# Patient Record
Sex: MALE | Race: WHITE | NOT HISPANIC OR LATINO | Employment: FULL TIME | URBAN - METROPOLITAN AREA
[De-identification: names, ages, dates, MRNs, and addresses within clinical notes are randomized per-mention and may not be internally consistent; named-entity substitution may affect disease eponyms.]

---

## 2018-03-20 ENCOUNTER — OFFICE VISIT (OUTPATIENT)
Dept: URGENT CARE | Facility: PHYSICIAN GROUP | Age: 33
End: 2018-03-20

## 2018-03-20 VITALS
HEIGHT: 72 IN | SYSTOLIC BLOOD PRESSURE: 112 MMHG | HEART RATE: 110 BPM | RESPIRATION RATE: 14 BRPM | TEMPERATURE: 97.7 F | WEIGHT: 215 LBS | DIASTOLIC BLOOD PRESSURE: 74 MMHG | BODY MASS INDEX: 29.12 KG/M2 | OXYGEN SATURATION: 98 %

## 2018-03-20 DIAGNOSIS — J01.00 ACUTE NON-RECURRENT MAXILLARY SINUSITIS: ICD-10-CM

## 2018-03-20 PROCEDURE — 99203 OFFICE O/P NEW LOW 30 MIN: CPT | Performed by: PHYSICIAN ASSISTANT

## 2018-03-20 RX ORDER — AMOXICILLIN AND CLAVULANATE POTASSIUM 875; 125 MG/1; MG/1
1 TABLET, FILM COATED ORAL 2 TIMES DAILY
Qty: 14 TAB | Refills: 0 | Status: SHIPPED | OUTPATIENT
Start: 2018-03-20 | End: 2018-03-27

## 2018-03-20 ASSESSMENT — ENCOUNTER SYMPTOMS
SPUTUM PRODUCTION: 0
CHILLS: 0
VOMITING: 0
DIARRHEA: 0
DIZZINESS: 0
SORE THROAT: 0
SHORTNESS OF BREATH: 0
SINUS PRESSURE: 1
MUSCULOSKELETAL NEGATIVE: 1
FEVER: 0
SINUS PAIN: 1
COUGH: 0
NAUSEA: 0
ABDOMINAL PAIN: 0

## 2018-03-20 NOTE — PROGRESS NOTES
Subjective:      Bruno Goncalves is a 33 y.o. male who presents with Sinus Problem (x3days sinus congestion)            Sinus Problem   This is a new problem. The current episode started in the past 7 days. The problem is unchanged. There has been no fever. His pain is at a severity of 3/10. The pain is mild. Associated symptoms include congestion and sinus pressure. Pertinent negatives include no chills, coughing, ear pain, shortness of breath, sneezing or sore throat. Past treatments include nothing.       Review of Systems   Constitutional: Negative for chills and fever.   HENT: Positive for congestion, sinus pain and sinus pressure. Negative for ear pain, sneezing and sore throat.    Respiratory: Negative for cough, sputum production and shortness of breath.    Cardiovascular: Negative for chest pain.   Gastrointestinal: Negative for abdominal pain, diarrhea, nausea and vomiting.   Genitourinary: Negative.    Musculoskeletal: Negative.    Skin: Negative for rash.   Neurological: Negative for dizziness.          Objective:     /74   Pulse (!) 110   Temp 36.5 °C (97.7 °F)   Resp 14   Ht 1.829 m (6')   Wt 97.5 kg (215 lb)   SpO2 98%   BMI 29.16 kg/m²      Physical Exam   Constitutional: He is oriented to person, place, and time. He appears well-developed and well-nourished. No distress.   HENT:   Head: Normocephalic and atraumatic.   Right Ear: Hearing, tympanic membrane, external ear and ear canal normal.   Left Ear: Hearing, tympanic membrane, external ear and ear canal normal.   Nose: Right sinus exhibits maxillary sinus tenderness. Right sinus exhibits no frontal sinus tenderness. Left sinus exhibits maxillary sinus tenderness. Left sinus exhibits no frontal sinus tenderness.   Mouth/Throat: Oropharynx is clear and moist. No oropharyngeal exudate, posterior oropharyngeal edema or posterior oropharyngeal erythema.   Eyes: Conjunctivae are normal. Pupils are equal, round, and reactive to light.  Right eye exhibits no discharge. Left eye exhibits no discharge.   Neck: Normal range of motion.   Cardiovascular: Normal rate, regular rhythm and normal heart sounds.    No murmur heard.  Pulmonary/Chest: Effort normal and breath sounds normal. No respiratory distress. He has no wheezes. He has no rales.   Musculoskeletal: Normal range of motion.   Lymphadenopathy:     He has no cervical adenopathy.   Neurological: He is alert and oriented to person, place, and time.   Skin: Skin is warm and dry. He is not diaphoretic.   Psychiatric: He has a normal mood and affect. His behavior is normal.   Nursing note and vitals reviewed.         PMH:  has no past medical history on file.  MEDS:   Current Outpatient Prescriptions:   •  amoxicillin-clavulanate (AUGMENTIN) 875-125 MG Tab, Take 1 Tab by mouth 2 times a day for 7 days., Disp: 14 Tab, Rfl: 0  •  ALBUTEROL SULFATE PO, Take  by mouth., Disp: , Rfl:   •  Ibuprofen (ADVIL PO), Take  by mouth., Disp: , Rfl:   •  hydrocodone-acetaminophen (NORCO) 7.5-325 MG per tablet, Take 1 Tab by mouth every 6 hours as needed for Mild Pain., Disp: 15 Tab, Rfl: 0  ALLERGIES: No Known Allergies  SURGHX: History reviewed. No pertinent surgical history.  SOCHX:  reports that he has never smoked. He has never used smokeless tobacco. He reports that he does not drink alcohol.  FH: family history is not on file.       Assessment/Plan:     1. Acute non-recurrent maxillary sinusitis  - amoxicillin-clavulanate (AUGMENTIN) 875-125 MG Tab; Take 1 Tab by mouth 2 times a day for 7 days.  Dispense: 14 Tab; Refill: 0   - Complete full course of antibiotics as prescribed     Discussed use of nedi-pot, humidifier, and Flonase nasal spray for symptomatic relief. Call or return to office if symptoms persist or worsen. The patient demonstrated a good understanding and agreed with the treatment plan.

## 2020-08-14 ENCOUNTER — OFFICE VISIT (OUTPATIENT)
Dept: URGENT CARE | Facility: PHYSICIAN GROUP | Age: 35
End: 2020-08-14

## 2020-08-14 ENCOUNTER — HOSPITAL ENCOUNTER (EMERGENCY)
Facility: MEDICAL CENTER | Age: 35
End: 2020-08-14
Attending: EMERGENCY MEDICINE
Payer: COMMERCIAL

## 2020-08-14 VITALS
BODY MASS INDEX: 29.95 KG/M2 | SYSTOLIC BLOOD PRESSURE: 122 MMHG | DIASTOLIC BLOOD PRESSURE: 70 MMHG | HEIGHT: 72 IN | RESPIRATION RATE: 15 BRPM | HEART RATE: 70 BPM | WEIGHT: 221.12 LBS | TEMPERATURE: 97.9 F | OXYGEN SATURATION: 95 %

## 2020-08-14 VITALS
RESPIRATION RATE: 16 BRPM | TEMPERATURE: 97.6 F | WEIGHT: 215 LBS | OXYGEN SATURATION: 96 % | DIASTOLIC BLOOD PRESSURE: 82 MMHG | BODY MASS INDEX: 29.12 KG/M2 | HEART RATE: 80 BPM | SYSTOLIC BLOOD PRESSURE: 100 MMHG | HEIGHT: 72 IN

## 2020-08-14 DIAGNOSIS — T18.128A FOOD IMPACTION OF ESOPHAGUS, INITIAL ENCOUNTER: ICD-10-CM
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
## 2020-08-14 DIAGNOSIS — T18.128A ESOPHAGEAL OBSTRUCTION DUE TO FOOD IMPACTION: ICD-10-CM
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
## 2020-08-14 PROCEDURE — 94770 HCHG CO2 EXPIRED GAS DETERMINATION: CPT

## 2020-08-14 PROCEDURE — 94760 N-INVAS EAR/PLS OXIMETRY 1: CPT

## 2020-08-14 PROCEDURE — 99284 EMERGENCY DEPT VISIT MOD MDM: CPT

## 2020-08-14 PROCEDURE — 502240 HCHG MISC OR SUPPLY RC 0272: Performed by: INTERNAL MEDICINE

## 2020-08-14 PROCEDURE — 160203 HCHG ENDO MINUTES - 1ST 30 MINS LEVEL 4: Performed by: INTERNAL MEDICINE

## 2020-08-14 PROCEDURE — 99152 MOD SED SAME PHYS/QHP 5/>YRS: CPT

## 2020-08-14 PROCEDURE — 96375 TX/PRO/DX INJ NEW DRUG ADDON: CPT

## 2020-08-14 PROCEDURE — 99213 OFFICE O/P EST LOW 20 MIN: CPT | Performed by: FAMILY MEDICINE

## 2020-08-14 PROCEDURE — 700111 HCHG RX REV CODE 636 W/ 250 OVERRIDE (IP): Performed by: EMERGENCY MEDICINE

## 2020-08-14 PROCEDURE — 700111 HCHG RX REV CODE 636 W/ 250 OVERRIDE (IP)

## 2020-08-14 PROCEDURE — 160048 HCHG OR STATISTICAL LEVEL 1-5: Performed by: INTERNAL MEDICINE

## 2020-08-14 PROCEDURE — 96374 THER/PROPH/DIAG INJ IV PUSH: CPT

## 2020-08-14 RX ORDER — ONDANSETRON 2 MG/ML
4 INJECTION INTRAMUSCULAR; INTRAVENOUS ONCE
Status: COMPLETED | OUTPATIENT
Start: 2020-08-14 | End: 2020-08-14

## 2020-08-14 RX ORDER — OMEPRAZOLE 20 MG/1
20 CAPSULE, DELAYED RELEASE ORAL DAILY
Qty: 30 CAP | Refills: 0 | Status: SHIPPED | OUTPATIENT
Start: 2020-08-14 | End: 2020-09-13

## 2020-08-14 RX ADMIN — ONDANSETRON 4 MG: 2 INJECTION INTRAMUSCULAR; INTRAVENOUS at 13:28

## 2020-08-14 RX ADMIN — GLUCAGON 1 MG: 1 INJECTION, POWDER, LYOPHILIZED, FOR SOLUTION INTRAMUSCULAR; INTRAVENOUS at 13:29

## 2020-08-14 RX ADMIN — PROPOFOL 370 MG: 10 INJECTION, EMULSION INTRAVENOUS at 15:54

## 2020-08-14 NOTE — PROGRESS NOTES
Subjective:      Bruno Goncalves is a 35 y.o. male who presents with Other (Pt states he feels like  somthing is stuck in chest, cant keep anything down)    - This is a pleasant and non toxic appearing 35 y.o. male with c/o eating steak and it got stuck last night. Unable to keep any liquids or saliva down since. No NVFC            ALLERGIES:  Patient has no known allergies.     PMH:  History reviewed. No pertinent past medical history.     PSH:  History reviewed. No pertinent surgical history.    MEDS:  No current outpatient medications on file.    ** I have documented what I find to be significant in regards to past medical, social, family and surgical history  in my HPI or under PMH/PSH/FH review section, otherwise it is contributory **             HPI    Review of Systems   All other systems reviewed and are negative.         Objective:     /82   Pulse 80   Temp 36.4 °C (97.6 °F) (Temporal)   Resp 16   Ht 1.829 m (6')   Wt 97.5 kg (215 lb)   SpO2 96%   BMI 29.16 kg/m²      Physical Exam  Vitals signs and nursing note reviewed.   Constitutional:       General: He is not in acute distress.     Appearance: He is well-developed. He is not diaphoretic.   HENT:      Head: Normocephalic and atraumatic.   Cardiovascular:      Heart sounds: Normal heart sounds. No murmur.   Pulmonary:      Effort: Pulmonary effort is normal. No respiratory distress.   Abdominal:      Palpations: Abdomen is soft.      Tenderness: There is no abdominal tenderness.   Skin:     Coloration: Skin is not pale.      Findings: No rash.   Neurological:      Mental Status: He is alert.      Motor: No abnormal muscle tone.   Psychiatric:         Mood and Affect: Mood normal.         Behavior: Behavior normal.         Judgment: Judgment normal.                 Assessment/Plan:            1. Food impaction of esophagus, initial encounter         * asked patient to go ER for eval. He says he will try coca cola at home 1st and if not  working then go to ER

## 2020-08-14 NOTE — ED TRIAGE NOTES
"Chief Complaint   Patient presents with   • Food Stuck In Throat     concerned may have small piece of steak stuck in throat from last NOC     /64   Pulse 81   Temp 36.6 °C (97.9 °F) (Oral)   Resp 15   Ht 1.829 m (6')   Wt 100.3 kg (221 lb 1.9 oz)   SpO2 91%   BMI 29.99 kg/m²     Covid Screen Negative.    Pt reports is unable to swallow water \"or anything else to try to get it down.\"  No difficulty speaking or breathing noted, able to swallow secretions without difficulty.    "

## 2020-08-14 NOTE — OP REPORT
DATE OF SERVICE:  08/14/2020     INDICATION FOR PROCEDURE:  esophageal foreign body    PROCEDURE PERFORMED: EGD with foreign body removal     CONSENT:  Informed consent was obtained directly from the patient after   benefits, risks and possible alternatives were discussed.     MEDICATIONS:  370mg IV Propofol was administered by the ED physician, Elvis Goncalves MD       PROCEDURE DESCRIPTION:  The patient was placed in the left lateral decubitus position and provided with supplemental oxygen via nasal cannula.  Vital signs were monitored continuously throughout the procedure.  When ready, the upper endoscope was placed in the patient's mouth and advanced easily and carefully to the esophagus and subsequently to the stomach and duodenum.The scope was slowly withdrawn and mucosa carefully examined. Retroflexion was performed in the stomach. The patients toleration of this procedure was excellent     FINDINGS:    Esophagus: a large food bolus was identified in the distal esophagus. This was removed using a combination of snare, cortes net and alligator forceps. There were linear furrows in the esophagus suggestive of EoE. No obvious stricture was identified    Stomach: normal    Duodenum: normal         COMPLICATIONS:  No complications or blood loss during or in the immediate postoperative period.     IMPRESSION:  1.  Successful food bolus extraction     RECOMMENDATION:    1. Discharge on daily PPI  2. Instruct patient to chew food well and limit bite size  3. Plan for outpatient EGD for dilation and diagnosis of EoE in 2-3 weeks  4. Ok to discharge home today

## 2020-08-14 NOTE — ED PROVIDER NOTES
ED Provider Note    CHIEF COMPLAINT  Chief Complaint   Patient presents with   • Food Stuck In Throat     concerned may have small piece of steak stuck in throat from last NOC       HPI  Bruno Goncalves is a 35 y.o. male who presents to the emerge department with inability to swallow food or fluids.  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  Returned home and barbecued rib eye steak.  He was eating quite quickly and likely large bites and then he became intolerant to eating the rest of meal for what he believed was a stuck piece of meat.  He has not changed throughout the night or today.  He is still intolerant to fluids.  He tried to use some Coca-Cola at home with no relief.  Went to local urgent care and then sent to the ER for further evaluation and possible GI consultation for removal.  Currently feeling well otherwise.  Tolerating secretions.    REVIEW OF SYSTEMS  See HPI for further details. All other systems are negative.     PAST MEDICAL HISTORY   has a past medical history of Asthma.    SOCIAL HISTORY  Social History     Tobacco Use   • Smoking status: Never Smoker   • Smokeless tobacco: Never Used   Substance and Sexual Activity   • Alcohol use: No   • Drug use: Not Currently   • Sexual activity: Not on file       SURGICAL HISTORY   has a past surgical history that includes upper gi endoscopy,remov f.b. (8/14/2020).    CURRENT MEDICATIONS  Home Medications     Reviewed by Sathya Sibley (Pharmacy Tech) on 08/14/20 at 1347  Med List Status: Complete   Medication Last Dose Status        Patient Guillaume Taking any Medications                       ALLERGIES  No Known Allergies    PHYSICAL EXAM  VITAL SIGNS: /70   Pulse 70   Temp 36.6 °C (97.9 °F) (Oral)   Resp 15   Ht 1.829 m (6')   Wt 100.3 kg (221 lb 1.9 oz)   SpO2 95%   BMI 29.99 kg/m²  @MARIO[[id number]::@  Pulse ox interpretation: I interpret this pulse ox as normal.  Constitutional:  Alert in no apparent distress.  HENT: Normocephalic, Atraumatic, Bilateral external ears normal. Nose normal.   Eyes: Pupils are equal and reactive. Conjunctiva normal, non-icteric.   Heart: Regular rate and rythm, no murmurs.    Lungs: Clear to auscultation bilaterally.  Skin: Warm, Dry, No erythema, No rash.   Neurologic: Alert, Grossly non-focal.   Psychiatric: Affect normal, Judgment normal, Mood normal, Appears appropriate and not intoxicated.     Procedural sedation: Given patient's lack of improvement with Coca-Cola, glucagon we have proceeded with GI consultation for food bolus removal via endoscopy.    Patient consents to sedation and procedure.  Patient placed on full monitoring including end-tidal CO2 with respiratory at bedside.  Bedside suction has been set up.  Propofol sedation was initiated with initial dosing at 70 mg.  Please see nursing note for timing of remaining doses with a total of 370 mg.  Patient tolerated sedation well.  No complications.  Fluid bolus was removed.      Please see GI note for endoscopic removal of food bolus            COURSE & MEDICAL DECISION MAKING  Pertinent Labs & Imaging studies reviewed. (See chart for details)  35-year-old male presenting to the emerge department with p.o. intolerance after likely steak food bolus impaction.  Failed initial medical treatment here in the emerge department led to GI consultation.  GI at bedside.  Please see their note for endoscopic procedure.  I did complete procedural sedation with propofol as noted above.  Patient tolerated well.  He will be discharged on omeprazole as per GI preference.  He will be followed up at the GI office in the next couple weeks as they have concern for possible eosinophilic esophagitis.  He is understanding return precautions and outpatient need to take in smaller amounts of food and fluid especially of smaller sizes.    The patient will return for worsening symptoms and is stable at the time of discharge.  The patient verbalizes understanding and will comply.    FINAL IMPRESSION  1. Esophageal obstruction due to food impaction               Electronically signed by: Elvis Goncalves M.D., 8/14/2020 1:32 PM

## 2020-08-14 NOTE — ED NOTES
Attempted to use soda and glucagon to pass food bolus. Unsuccessful. MD notified. Pt updated on plan of care. Patient resting in bed. Patient occasionally self adjusts in bed. Bed is at lowest position and locked. Call light and preferred belongings in reach. Patient denies pain and any current needs. Will continue to monitor.

## 2020-08-14 NOTE — CONSULTS
Gastroenterology Consult Note     Date of Consult: 08/14/2020  Referring Physician: Sacha     Reason for consult: esophageal foreign body        HPI: This is a 36 yo male with history of asthma who presents after developing a food impaction. He says that he was eating steak last night. He immediately felt food get stuck in his chest. He reports that he is unable to drink fluids or tolerate his secretions. He does have heartburn on occasion and has had a sensation that food has gotten stuck previously. He has never had an EGD for foreign body removal. He says that he has no other pain and no other symptoms.     PMHX:  Past Medical History:   Diagnosis Date   • Asthma           PSurgHx: no prior surgeries     ALLERGIES:Patient has no known allergies.     SocHx:   Social History     Socioeconomic History   • Marital status: Single     Spouse name: Not on file   • Number of children: Not on file   • Years of education: Not on file   • Highest education level: Not on file   Occupational History   • Not on file   Social Needs   • Financial resource strain: Not on file   • Food insecurity     Worry: Not on file     Inability: Not on file   • Transportation needs     Medical: Not on file     Non-medical: Not on file   Tobacco Use   • Smoking status: Never Smoker   • Smokeless tobacco: Never Used   Substance and Sexual Activity   • Alcohol use: No   • Drug use: Not Currently   • Sexual activity: Not on file   Lifestyle   • Physical activity     Days per week: Not on file     Minutes per session: Not on file   • Stress: Not on file   Relationships   • Social connections     Talks on phone: Not on file     Gets together: Not on file     Attends Zoroastrian service: Not on file     Active member of club or organization: Not on file     Attends meetings of clubs or organizations: Not on file     Relationship status: Not on file   • Intimate partner violence     Fear of current or ex  partner: Not on file     Emotionally abused: Not on file     Physically abused: Not on file     Forced sexual activity: Not on file   Other Topics Concern   • Not on file   Social History Narrative   • Not on file        FAMHx: no family history of GI disorders     ROS:  Constitutional: No fevers, chills, no night sweats, no weight changes  HEENT: no vision or hearing changes, no dry mouth, no change in smell  CARDIO: no palpitations, no orthopnea, no chest pain  PULM: no cough, no shortness of breath  NEURO: no Seizures, no memory impairment, no change in sensation  GI: as above  : no dysuria, no hematuria  HEME: no anemia, no easy brusing  MUSCULOSKELETAL: no muscle aches, no back pain, no arthritis  PSYCH: no anxiety or depression  SKIN: no rashes     PE:  Vitals:    08/14/20 1309 08/14/20 1311 08/14/20 1406 08/14/20 1409   BP: 123/73   111/64   Pulse:  79 68 71   Resp:       Temp:       TempSrc:       SpO2:  93% 95% 95%   Weight:       Height:         Gen: AAOx3, NAD, lying in bed  HEENT: PERRL, EOMI, nares patent, Mucous membranes moist  Neck: supple, no cervical or supraclavicular adenopathy  CVS: regular rhythm, normal rate, no MRG  Pulm: CTAB, no crackles  Abd: soft, Nd, NT, no guarding or rebound  Ext: no edema, normal sensation  NEURO: grossly normal, no weakness  Skin: warm, no rash  Psych: normal Affect, no anxiety     LABS:  Lab Results   Component Value Date/Time    SODIUM 136 09/05/2012 08:53 AM    POTASSIUM 4.4 09/05/2012 08:53 AM    CHLORIDE 103 09/05/2012 08:53 AM    CO2 25 09/05/2012 08:53 AM    GLUCOSE 88 09/05/2012 08:53 AM    BUN 22 09/05/2012 08:53 AM    CREATININE 0.88 09/05/2012 08:53 AM      Lab Results   Component Value Date/Time    WBC 8.1 09/05/2012 08:53 AM    RBC 5.70 09/05/2012 08:53 AM    HEMOGLOBIN 17.7 09/05/2012 08:53 AM    HEMATOCRIT 51.0 09/05/2012 08:53 AM    MCV 89.4 09/05/2012 08:53 AM    MCH 31.0 09/05/2012 08:53 AM    MCHC 34.6 09/05/2012 08:53 AM    MPV 10.6 (H)  09/05/2012 08:53 AM    NEUTSPOLYS 66.4 09/05/2012 08:53 AM    LYMPHOCYTES 20.9 (L) 09/05/2012 08:53 AM    MONOCYTES 7.7 09/05/2012 08:53 AM    EOSINOPHILS 4.7 09/05/2012 08:53 AM    BASOPHILS 0.3 09/05/2012 08:53 AM        No results found for: PROTHROMBTM, INR            Problem List Items Addressed This Visit     None           ASSESSMENT/PLAN: 36 yo male with esophageal food impaction. Here for removal. Will perform urgent bedside EGD. Keep NPO. Additional recommendations to follow the EGD.    Thank you for this consult.     Adam Vivas MD

## 2021-06-20 ENCOUNTER — HOSPITAL ENCOUNTER (EMERGENCY)
Facility: MEDICAL CENTER | Age: 36
End: 2021-06-20

## 2021-06-21 NOTE — ED NOTES
"Pt called to triage Reports \" the food went down and I can drink \" pt refusing to be seen Encouraged pt to return for problems and F/U with PMD Pt AAO and  LWBS  "

## 2022-11-24 ENCOUNTER — OFFICE VISIT (OUTPATIENT)
Dept: URGENT CARE | Facility: CLINIC | Age: 37
End: 2022-11-24

## 2022-11-24 ENCOUNTER — APPOINTMENT (OUTPATIENT)
Dept: RADIOLOGY | Facility: IMAGING CENTER | Age: 37
End: 2022-11-24
Attending: PHYSICIAN ASSISTANT

## 2022-11-24 VITALS
BODY MASS INDEX: 29.16 KG/M2 | HEIGHT: 73 IN | OXYGEN SATURATION: 94 % | HEART RATE: 111 BPM | TEMPERATURE: 97.6 F | SYSTOLIC BLOOD PRESSURE: 126 MMHG | WEIGHT: 220 LBS | DIASTOLIC BLOOD PRESSURE: 86 MMHG | RESPIRATION RATE: 16 BRPM

## 2022-11-24 DIAGNOSIS — S92.325A CLOSED NONDISPLACED FRACTURE OF SECOND METATARSAL BONE OF LEFT FOOT, INITIAL ENCOUNTER: Primary | ICD-10-CM

## 2022-11-24 DIAGNOSIS — S90.32XA CONTUSION OF LEFT FOOT, INITIAL ENCOUNTER: ICD-10-CM

## 2022-11-24 PROCEDURE — 99213 OFFICE O/P EST LOW 20 MIN: CPT | Performed by: PHYSICIAN ASSISTANT

## 2022-11-24 PROCEDURE — 73630 X-RAY EXAM OF FOOT: CPT | Mod: TC,LT | Performed by: RADIOLOGY

## 2022-11-25 ASSESSMENT — ENCOUNTER SYMPTOMS
MYALGIAS: 0
COUGH: 0
EYE PAIN: 0
ABDOMINAL PAIN: 0
CONSTIPATION: 0
CHILLS: 0
FEVER: 0
NAUSEA: 0
DIARRHEA: 0
SHORTNESS OF BREATH: 0
VOMITING: 0
SORE THROAT: 0
HEADACHES: 0

## 2022-11-26 NOTE — PROGRESS NOTES
"TypeSubjective:   Bruno Goncalves is a 37 y.o. male who presents for Foot Injury (X 2 days, left foot injury)      37-year-old male was having [employer] lights that yesterday when the ladder collapsed crushing his of his left foot.  He was able to finish climbing a ladder however after walking on it more today he presents urgent care for concern of injury and refracture.  He denies any numbness or tingling.    Review of Systems   Constitutional:  Negative for chills and fever.   HENT:  Negative for congestion, ear pain and sore throat.    Eyes:  Negative for pain.   Respiratory:  Negative for cough and shortness of breath.    Cardiovascular:  Negative for chest pain.   Gastrointestinal:  Negative for abdominal pain, constipation, diarrhea, nausea and vomiting.   Genitourinary:  Negative for dysuria.   Musculoskeletal:  Negative for myalgias.   Skin:  Negative for rash.   Neurological:  Negative for headaches.     Medications, Allergies, and current problem list reviewed today in Epic.     Objective:     /86 (BP Location: Right arm, Patient Position: Sitting, BP Cuff Size: Large adult)   Pulse (!) 111   Temp 36.4 °C (97.6 °F) (Temporal)   Resp 16   Ht 1.854 m (6' 1\")   Wt 99.8 kg (220 lb)   SpO2 94%     Physical Exam  Vitals reviewed.   Constitutional:       Appearance: Normal appearance.   HENT:      Head: Normocephalic and atraumatic.      Right Ear: External ear normal.      Left Ear: External ear normal.      Nose: Nose normal.      Mouth/Throat:      Mouth: Mucous membranes are moist.   Eyes:      Conjunctiva/sclera: Conjunctivae normal.   Cardiovascular:      Rate and Rhythm: Normal rate.   Pulmonary:      Effort: Pulmonary effort is normal.   Musculoskeletal:      Comments: Diffuse edema with ecchymosis and tenderness over dorsal foot midfoot third metatarsal most prominent.  No malrotation or mall angulation.  Neurovascularly intact.   Skin:     General: Skin is warm and dry.      Capillary " Refill: Capillary refill takes less than 2 seconds.   Neurological:      Mental Status: He is alert and oriented to person, place, and time.       RADIOLOGY RESULTS   DX-FOOT-COMPLETE 3+ LEFT    Result Date: 11/24/2022 11/24/2022 4:49 PM HISTORY/REASON FOR EXAM:  Pain/Deformity Following Trauma; foot crush injury with ladder today TECHNIQUE/EXAM DESCRIPTION AND NUMBER OF VIEWS: 3 of the left foot COMPARISON: None. FINDINGS: There is normal bony mineralization.  There is a nondisplaced linear fracture involving the left second metatarsal neck..     1.  Nondisplaced fracture of left second metatarsal neck.           Assessment/Plan:     Diagnosis and associated orders:     1. Closed nondisplaced fracture of second metatarsal bone of left foot, initial encounter        2. Contusion of left foot, initial encounter  DX-FOOT-COMPLETE 3+ LEFT         Comments/MDM:     Discussed likely 6 to 8-week healing with de-escalation of immobilization with point tenderness improves.  Recommend ice and elevation.  Recommend stiff soled shoe such as a postop shoe.  We discussed a cam walker boot but currently he does not have insurance and I think that this would be overly expensive with more affordable options available OTC.  Patient demonstrate understanding.  No sign of neurovascular compromise         Differential diagnosis, natural history, supportive care, and indications for immediate follow-up discussed.    Advised the patient to follow-up with the primary care physician for recheck, reevaluation, and consideration of further management.    Please note that this dictation was created using voice recognition software. I have made a reasonable attempt to correct obvious errors, but I expect that there are errors of grammar and possibly content that I did not discover before finalizing the note.    This note was electronically signed by Neno Jesus PA-C

## 2023-12-11 ENCOUNTER — OFFICE VISIT (OUTPATIENT)
Dept: URGENT CARE | Facility: PHYSICIAN GROUP | Age: 38
End: 2023-12-11
Payer: COMMERCIAL

## 2023-12-11 VITALS
DIASTOLIC BLOOD PRESSURE: 84 MMHG | OXYGEN SATURATION: 96 % | HEIGHT: 72 IN | WEIGHT: 243 LBS | SYSTOLIC BLOOD PRESSURE: 114 MMHG | BODY MASS INDEX: 32.91 KG/M2 | TEMPERATURE: 97.6 F | RESPIRATION RATE: 16 BRPM | HEART RATE: 94 BPM

## 2023-12-11 DIAGNOSIS — R06.2 WHEEZING: ICD-10-CM

## 2023-12-11 DIAGNOSIS — J45.21 MILD INTERMITTENT REACTIVE AIRWAY DISEASE WITH ACUTE EXACERBATION: Primary | ICD-10-CM

## 2023-12-11 DIAGNOSIS — R05.1 ACUTE COUGH: ICD-10-CM

## 2023-12-11 DIAGNOSIS — J06.9 VIRAL UPPER RESPIRATORY TRACT INFECTION: ICD-10-CM

## 2023-12-11 PROCEDURE — 3079F DIAST BP 80-89 MM HG: CPT | Performed by: NURSE PRACTITIONER

## 2023-12-11 PROCEDURE — 99214 OFFICE O/P EST MOD 30 MIN: CPT | Mod: 25 | Performed by: NURSE PRACTITIONER

## 2023-12-11 PROCEDURE — 94640 AIRWAY INHALATION TREATMENT: CPT | Performed by: NURSE PRACTITIONER

## 2023-12-11 PROCEDURE — 3074F SYST BP LT 130 MM HG: CPT | Performed by: NURSE PRACTITIONER

## 2023-12-11 RX ORDER — ALBUTEROL SULFATE 90 UG/1
1-2 AEROSOL, METERED RESPIRATORY (INHALATION) EVERY 6 HOURS PRN
Qty: 8.5 G | Refills: 0 | Status: SHIPPED | OUTPATIENT
Start: 2023-12-11

## 2023-12-11 RX ORDER — BENZONATATE 100 MG/1
100 CAPSULE ORAL 3 TIMES DAILY PRN
Qty: 60 CAPSULE | Refills: 0 | Status: SHIPPED | OUTPATIENT
Start: 2023-12-11

## 2023-12-11 RX ORDER — METHYLPREDNISOLONE 4 MG/1
TABLET ORAL
Qty: 21 TABLET | Refills: 0 | Status: SHIPPED | OUTPATIENT
Start: 2023-12-11

## 2023-12-11 RX ORDER — ALBUTEROL SULFATE 2.5 MG/3ML
2.5 SOLUTION RESPIRATORY (INHALATION) ONCE
Status: COMPLETED | OUTPATIENT
Start: 2023-12-11 | End: 2023-12-11

## 2023-12-11 RX ADMIN — ALBUTEROL SULFATE 2.5 MG: 2.5 SOLUTION RESPIRATORY (INHALATION) at 17:04

## 2023-12-12 NOTE — PROGRESS NOTES
Bruno Goncalves is a 38 y.o. male who presents for Shortness of Breath (Had a productive cough last week. This week, still has a productive cough but turns dry throughout the day. He is having a hard time sleeping. Had amoxicillin at home that he took last week. )      HPI  This is a new problem. Bruno Goncalves is a 38 y.o. patient who presents to urgent care with c/o: lungs feel restricted. Ran out of his inhaler. Coughing deep for 2 weeks. Fever last week. Sweats and chills last week. Tx tried: sudafed, nyquil, amoxicillin 500 mg qd for 5 days) , hot tea. No other aggravating or alleviating factors.       ROS See HPI    Allergies:     No Known Allergies    PMSFS Hx:  Past Medical History:   Diagnosis Date    Asthma      Past Surgical History:   Procedure Laterality Date    ID UPPER GI ENDOSCOPY,REMOV F.B.  8/14/2020    Procedure: GASTROSCOPY, WITH FOREIGN BODY REMOVAL;  Surgeon: Adam Vivas M.D.;  Location: SURGERY [facility] [place];  Service: Gastroenterology     History reviewed. No pertinent family history.  Social History     Tobacco Use    Smoking status: Never    Smokeless tobacco: Never   Substance Use Topics    Alcohol use: No       Problems:   There is no problem list on file for this patient.      Medications:   No current outpatient medications on file prior to visit.     No current facility-administered medications on file prior to visit.        Objective:     /84 (BP Location: Left arm, Patient Position: Sitting, BP Cuff Size: Adult long)   Pulse 94   Temp 36.4 °C (97.6 °F) (Temporal)   Resp 16   Ht 1.829 m (6')   Wt 110 kg (243 lb)   SpO2 92%   BMI 32.96 kg/m²     Physical Exam  Nursing note reviewed.   Constitutional:       General: He is not in acute distress.     Appearance: Normal appearance. He is well-developed and well-groomed. He is not ill-appearing or toxic-appearing.   HENT:      Head: Normocephalic and atraumatic.      Right Ear: Tympanic membrane, ear  canal and external ear normal.      Left Ear: Tympanic membrane, ear canal and external ear normal.      Nose: Nose normal.      Mouth/Throat:      Mouth: Mucous membranes are moist.   Eyes:      Conjunctiva/sclera: Conjunctivae normal.   Cardiovascular:      Rate and Rhythm: Normal rate and regular rhythm.      Pulses: Normal pulses.      Heart sounds: Normal heart sounds.   Pulmonary:      Effort: Pulmonary effort is normal. No accessory muscle usage.      Breath sounds: Normal breath sounds and air entry.   Musculoskeletal:      Cervical back: Neck supple.   Lymphadenopathy:      Cervical: No cervical adenopathy.      Upper Body:      Right upper body: No supraclavicular adenopathy.      Left upper body: No supraclavicular adenopathy.   Skin:     General: Skin is warm and dry.      Capillary Refill: Capillary refill takes less than 2 seconds.   Neurological:      Mental Status: He is alert and oriented to person, place, and time.   Psychiatric:         Mood and Affect: Mood normal.         Behavior: Behavior normal.         Thought Content: Thought content normal.           Demonstration &/or evaluation of pt utilization of a nebulizer and evaluation of results. Pt tolerated well. No adverse events. SpO2 post treatment 96%.  Auscultation posttreatment improved Vt. Resolved wheezing. Post treatment.  .     Assessment /Associated Orders:      1. Mild intermittent reactive airway disease with acute exacerbation  albuterol 108 (90 Base) MCG/ACT Aero Soln inhalation aerosol      2. Wheezing  albuterol (Proventil) 2.5mg/3ml nebulizer solution 2.5 mg    methylPREDNISolone (MEDROL DOSEPAK) 4 MG Tablet Therapy Pack      3. Viral upper respiratory tract infection        4. Acute cough  benzonatate (TESSALON) 100 MG Cap            Medical Decision Making:    CHAITANYA is a very pleasant 38 y.o. male who is clinically stable at today's acute urgent care visit.  No acute distress noted.  VSS. Appropriate for outpatient care at this  time.   Acute problem today with uncertain prognosis.   Educated in proper administration of  prescription medication(s) ordered today including safety, possible SE, risks, benefits, rationale and alternatives to therapy.   Keep well hydrated    Discussed Dx, management options (risks,benefits, and alternatives to planned treatment), natural progression and supportive care.  Expressed understanding and the treatment plan was agreed upon.   Questions were encouraged and answered   Return to urgent care prn if new or worsening sx or if there is no improvement in condition prn.    Educated in Red flags and indications to immediately call 911 or present to the Emergency Department.       Time I spent evaluating Bruno Goncalves in urgent care today was 31  minutes. This time includes preparing for visit, reviewing any pertinent notes or test results, counseling/education, exam, obtaining HPI, interpretation of lab tests, medication management and documentation as indicated above.Time does not include separately billable procedures noted .       Please note that this dictation was created using voice recognition software. I have worked with consultants from the vendor as well as technical experts from [facility] to optimize the interface. I have made every reasonable attempt to correct obvious errors, but I expect that there are errors of grammar and possibly content that I did not discover before finalizing the note.  This note was electronically signed by provider

## 2025-02-27 ENCOUNTER — HOSPITAL ENCOUNTER (OUTPATIENT)
Dept: LAB | Facility: MEDICAL CENTER | Age: 40
End: 2025-02-27
Attending: UROLOGY
Payer: COMMERCIAL

## 2025-02-27 LAB
FSH SERPL-ACNC: 3.4 MIU/ML (ref 1.5–12.4)
LH SERPL-ACNC: 5 IU/L (ref 1.7–8.6)
PROLACTIN SERPL-MCNC: 9.87 NG/ML (ref 2.1–17.7)

## 2025-02-27 PROCEDURE — 84146 ASSAY OF PROLACTIN: CPT

## 2025-02-27 PROCEDURE — 83001 ASSAY OF GONADOTROPIN (FSH): CPT

## 2025-02-27 PROCEDURE — 84270 ASSAY OF SEX HORMONE GLOBUL: CPT

## 2025-02-27 PROCEDURE — 84402 ASSAY OF FREE TESTOSTERONE: CPT

## 2025-02-27 PROCEDURE — 36415 COLL VENOUS BLD VENIPUNCTURE: CPT

## 2025-02-27 PROCEDURE — 84403 ASSAY OF TOTAL TESTOSTERONE: CPT

## 2025-02-27 PROCEDURE — 83002 ASSAY OF GONADOTROPIN (LH): CPT

## 2025-03-01 LAB
SHBG SERPL-SCNC: 16 NMOL/L (ref 17–56)
TESTOST FREE MFR SERPL: 2.4 % (ref 1.6–2.9)
TESTOST FREE SERPL-MCNC: 40 PG/ML (ref 47–244)
TESTOST SERPL-MCNC: 169 NG/DL (ref 300–890)

## 2025-05-22 ENCOUNTER — HOSPITAL ENCOUNTER (OUTPATIENT)
Dept: LAB | Facility: MEDICAL CENTER | Age: 40
End: 2025-05-22
Attending: UROLOGY
Payer: COMMERCIAL

## 2025-05-22 LAB
BASOPHILS # BLD AUTO: 0.4 % (ref 0–1.8)
BASOPHILS # BLD: 0.03 K/UL (ref 0–0.12)
EOSINOPHIL # BLD AUTO: 0.31 K/UL (ref 0–0.51)
EOSINOPHIL NFR BLD: 4.2 % (ref 0–6.9)
ERYTHROCYTE [DISTWIDTH] IN BLOOD BY AUTOMATED COUNT: 41 FL (ref 35.9–50)
ESTRADIOL SERPL-MCNC: 34.4 PG/ML
HCT VFR BLD AUTO: 49.4 % (ref 42–52)
HGB BLD-MCNC: 17.2 G/DL (ref 14–18)
IMM GRANULOCYTES # BLD AUTO: 0.03 K/UL (ref 0–0.11)
IMM GRANULOCYTES NFR BLD AUTO: 0.4 % (ref 0–0.9)
LYMPHOCYTES # BLD AUTO: 3.08 K/UL (ref 1–4.8)
LYMPHOCYTES NFR BLD: 41.7 % (ref 22–41)
MCH RBC QN AUTO: 29.7 PG (ref 27–33)
MCHC RBC AUTO-ENTMCNC: 34.8 G/DL (ref 32.3–36.5)
MCV RBC AUTO: 85.3 FL (ref 81.4–97.8)
MONOCYTES # BLD AUTO: 0.78 K/UL (ref 0–0.85)
MONOCYTES NFR BLD AUTO: 10.6 % (ref 0–13.4)
NEUTROPHILS # BLD AUTO: 3.15 K/UL (ref 1.82–7.42)
NEUTROPHILS NFR BLD: 42.7 % (ref 44–72)
NRBC # BLD AUTO: 0 K/UL
NRBC BLD-RTO: 0 /100 WBC (ref 0–0.2)
PLATELET # BLD AUTO: 222 K/UL (ref 164–446)
PMV BLD AUTO: 11.5 FL (ref 9–12.9)
PSA SERPL DL<=0.01 NG/ML-MCNC: 0.86 NG/ML (ref 0–4)
RBC # BLD AUTO: 5.79 M/UL (ref 4.7–6.1)
WBC # BLD AUTO: 7.4 K/UL (ref 4.8–10.8)

## 2025-05-22 PROCEDURE — 84403 ASSAY OF TOTAL TESTOSTERONE: CPT

## 2025-05-22 PROCEDURE — 85025 COMPLETE CBC W/AUTO DIFF WBC: CPT

## 2025-05-22 PROCEDURE — 82670 ASSAY OF TOTAL ESTRADIOL: CPT

## 2025-05-22 PROCEDURE — 84270 ASSAY OF SEX HORMONE GLOBUL: CPT

## 2025-05-22 PROCEDURE — 84402 ASSAY OF FREE TESTOSTERONE: CPT

## 2025-05-22 PROCEDURE — 84153 ASSAY OF PSA TOTAL: CPT

## 2025-05-22 PROCEDURE — 36415 COLL VENOUS BLD VENIPUNCTURE: CPT

## 2025-05-24 LAB
SHBG SERPL-SCNC: 15 NMOL/L (ref 17–56)
TESTOST FREE MFR SERPL: 2.8 % (ref 1.6–2.9)
TESTOST FREE SERPL-MCNC: 232 PG/ML (ref 47–244)
TESTOST SERPL-MCNC: 841 NG/DL (ref 300–890)

## (undated) DEVICE — KIT CUSTOM PROCEDURE SINGLE FOR ENDO  (15/CA)

## (undated) DEVICE — RESCUE RETRIEVAL NET (5EA/BX)

## (undated) DEVICE — CAPTIVATOR II-10MM ROUND STIFF  (40/BX)

## (undated) DEVICE — FORCEP GRASPING RESCUE ALLIGATOR LONG (5EA/BX)